# Patient Record
Sex: MALE | Race: WHITE
[De-identification: names, ages, dates, MRNs, and addresses within clinical notes are randomized per-mention and may not be internally consistent; named-entity substitution may affect disease eponyms.]

---

## 2017-06-28 ENCOUNTER — HOSPITAL ENCOUNTER (OUTPATIENT)
Dept: HOSPITAL 53 - M LAB | Age: 32
End: 2017-06-28
Attending: PODIATRIST
Payer: OTHER GOVERNMENT

## 2017-06-28 DIAGNOSIS — Z01.812: Primary | ICD-10-CM

## 2017-06-28 LAB
ANION GAP SERPL CALC-SCNC: 3 MEQ/L (ref 8–16)
BASOPHILS # BLD AUTO: 0.1 K/MM3 (ref 0–0.2)
BASOPHILS NFR BLD AUTO: 1.6 % (ref 0–1)
BUN SERPL-MCNC: 17 MG/DL (ref 7–18)
CALCIUM SERPL-MCNC: 8.9 MG/DL (ref 8.5–10.1)
CHLORIDE SERPL-SCNC: 107 MEQ/L (ref 98–107)
CO2 SERPL-SCNC: 32 MEQ/L (ref 21–32)
CREAT SERPL-MCNC: 1.01 MG/DL (ref 0.7–1.3)
EOSINOPHIL # BLD AUTO: 0.1 K/MM3 (ref 0–0.5)
EOSINOPHIL NFR BLD AUTO: 1.4 % (ref 0–3)
ERYTHROCYTE [DISTWIDTH] IN BLOOD BY AUTOMATED COUNT: 12.2 % (ref 11.5–14.5)
GFR SERPL CREATININE-BSD FRML MDRD: > 60 ML/MIN/{1.73_M2} (ref 60–?)
GLUCOSE SERPL-MCNC: 81 MG/DL (ref 70–105)
LARGE UNSTAINED CELL #: 0.2 K/MM3 (ref 0–0.4)
LARGE UNSTAINED CELL %: 2.5 % (ref 0–4)
LYMPHOCYTES # BLD AUTO: 2.7 K/MM3 (ref 1.5–4.5)
LYMPHOCYTES NFR BLD AUTO: 40.8 % (ref 24–44)
MCH RBC QN AUTO: 31.5 PG (ref 27–33)
MCHC RBC AUTO-ENTMCNC: 35.6 G/DL (ref 32–36.5)
MCV RBC AUTO: 88.4 FL (ref 80–96)
MONOCYTES # BLD AUTO: 0.4 K/MM3 (ref 0–0.8)
MONOCYTES NFR BLD AUTO: 5.3 % (ref 0–5)
NEUTROPHILS # BLD AUTO: 3.2 K/MM3 (ref 1.8–7.7)
NEUTROPHILS NFR BLD AUTO: 48.4 % (ref 36–66)
PLATELET # BLD AUTO: 268 K/MM3 (ref 150–450)
POTASSIUM SERPL-SCNC: 4.3 MEQ/L (ref 3.5–5.1)
SODIUM SERPL-SCNC: 142 MEQ/L (ref 136–145)
WBC # BLD AUTO: 6.6 K/MM3 (ref 4–10)

## 2017-07-07 ENCOUNTER — HOSPITAL ENCOUNTER (OUTPATIENT)
Dept: HOSPITAL 53 - M SDC | Age: 32
Discharge: HOME | End: 2017-07-07
Attending: PODIATRIST
Payer: OTHER GOVERNMENT

## 2017-07-07 VITALS — WEIGHT: 205 LBS | HEIGHT: 68 IN | BODY MASS INDEX: 31.07 KG/M2

## 2017-07-07 VITALS — SYSTOLIC BLOOD PRESSURE: 119 MMHG | DIASTOLIC BLOOD PRESSURE: 82 MMHG

## 2017-07-07 DIAGNOSIS — Z88.0: ICD-10-CM

## 2017-07-07 DIAGNOSIS — M20.12: Primary | ICD-10-CM

## 2017-07-07 DIAGNOSIS — M21.622: ICD-10-CM

## 2017-07-07 PROCEDURE — 28113 PART REMOVAL OF METATARSAL: CPT

## 2017-07-07 PROCEDURE — 97116 GAIT TRAINING THERAPY: CPT

## 2017-07-07 PROCEDURE — 73630 X-RAY EXAM OF FOOT: CPT

## 2017-07-07 PROCEDURE — 88300 SURGICAL PATH GROSS: CPT

## 2017-07-07 PROCEDURE — 28296 COR HLX VLGS DSTL MTAR OSTEO: CPT

## 2017-07-07 NOTE — REP
Clinical:  Status post surgery for baseline.

 

Technique:  Portable AP, lateral, bilateral oblique views.

 

Findings:

The patient is status post bunionectomy with postsurgical changes involving the

first and fifth metatarsal bones.  Overlying soft tissue swelling and small

amount of subcutaneous emphysema are appreciated.

 

Impression:

Status post operative procedures involving the first and fifth metatarsal bones.

Satisfactory alignment suggested.

 

 

Signed by

Yonny Lucero MD 07/07/2017 11:18 A

## 2017-07-08 NOTE — RO
DATE OF PROCEDURE:  07/07/2017

 

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

the left foot.  Bunionette deformity of the left foot.

 

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

the left foot.  Bunionette deformity of the left foot.

 

PROCEDURE PERFORMED:

1.  Veena bunionectomy with internal screw fixation, a 3.0 mm x 24 mm times one,

left foot.

2.  Tailor's bunionectomy with distal V osteotomy and internal screw fixation 2.5

x 16 mm times one, left foot.

 

SURGEON:  Eric Carmichael DPM

 

FIRST ASSISTANT:  None.

 

ANESTHESIA:  Local monitored anesthesia care (MAC).

 

IRRIGATIONS:  Dilute bacitracin, neomycin and polymyxin B solution.

 

HEMOSTASIS:  Ankle pneumatic tourniquet at 250 mmHg for 58 minutes.

 

HARDWARE UTILIZED:  Right DART-FIRE 3.0 x 24 and a 2.5 x 16.

 

DESCRIPTION OF OPERATION:  On 07/07/2017 this 31-year-old white male was taken

from his hospital room to the operating room and placed on the operating room

table in supine position.  Following the induction of IV sedation, local and

regional anesthesia, the left lower extremity was prepped and draped in the usual

aseptic manner.  The left lower extremity was elevated 45 degrees from the

horizontal plane for the purpose of preoperative exsanguination of the limb.

During this 3 minute time period, an ankle pneumatic tourniquet was applied just

proximal to the medial and lateral malleolus, well padded site.  To further

exsanguinate the limb, a Lazaro's esmarch bandage was placed circumferentially

extending from the digits to the distal edge of the ankle pneumatic tourniquet.

The ankle pneumatic tourniquet was rapidly inflated to 250 mmHg for the purpose

of intraoperative hemostasis.  Lazaro's esmarch bandage was removed.  The left

lower extremity was returned to the operating room table, sterile drape was

placed and the following procedure was performed:

 

VEENA BUNIONECTOMY WITH INTERNAL SCREW FIXATION, A 3.0 mm x 24 mm TIMES ONE,

LEFT FOOT:  Attention was directed to the patient's left foot where there was

noted to be a hallux valgus deformity.  At this time, a 6 cm incision was placed

over the first metatarsal phalangeal joint medial to the extensor tendon.  The

incision was deepened through subcutaneous tissues and all coursing venous

tributaries were identified, underscored, clamped, cut, ligated, and

electrocoagulated as necessary.  A linear capsulotomy was then performed in the

same plane as the original skin incision.  The capsular and periosteal structures

were dissected dorsally, medially and laterally, thus creatinine a capsular

periosteal type envelope.  This delivered into view the hypertrophied medial

eminence of the first metatarsal, which was osteotomized from dorsal to plantar

through and through, exiting medially to the sesamoidal groove.

 

Attention was directed into the first intermetatarsal space where the conjoined

tendon was sharply dissected free from the fibular sesamoid.  A V shaped

osteotomy was then performed on the medial aspect of the first metatarsal with a

long plantar and short dorsal wing.  It was transposed approximately 40% of the

width of the shaft of the first metatarsal and fixated with a 3.0 x 24 mm

cannulated compression screw.  The screw did not penetrate the inferior cartilage

on direct visualization.  The redundant cortical spike was then osteotomized from

dorsal to plantar through and through and extirpated from the wound in toto. The

medial surface was rasped to a smooth contour with a handheld rasp.  The wound

was flushed with copious amounts of dilute Bacitracin, Neomycin, and Polymyxin B

solution. Attention was directed towards closure where the capsular structures

were coapted and maintained utilizing #2-0 Monocryl in a simple interrupted type

fashion. The subcutaneous tissues were coapted and maintained utilizing #4-0

Monocryl in a simple interrupted type fashion. The skin incision was coapted and

maintained using #5-0 Monocryl in a continuous subcuticular type fashion.  This

was additionally reinforced with Steri-Strips.  Attention was then directed to

the 5th metatarsal where the following procedure was performed.

 

TAILOR'S BUNIONECTOMY WITH DISTAL V OSTEOTOMY AND INTERNAL SCREW FIXATION 2.5 x

16 mm TIMES ONE, LEFT FOOT:  Attention was directed to the patient's left foot

where there was noted to be a tailor's bunion deformity.  At this time, a 4 cm

lateral incision was placed at the 5th metatarsal phalangeal joint.  The incision

was deepened through subcutaneous tissues and all coursing venous tributaries

were identified, underscored, clamped, cut, ligated, and electrocoagulated as

necessary.  A linear capsulotomy was then performed in the same plane as the

original skin incision.  A capsule was then created dorsally and plantarly.  The

hypertrophied lateral eminence was osteotomized from distal to proximal through

and through.  This was extirpated from the wound.  A V shaped osteotomy was then

performed in the distal metaphysis of the 5th metatarsal with long plantar and

short dorsal wing and transposed approximately 40% of the width of the shaft of

the 5th metatarsal and fixated with a 2.5 x 16 mm cannulated compression screw.

The screw did not penetrate the inferior cartilage felt through range of motion.

The redundant cortical spike was osteotomized from dorsal to plantar through and

through and extirpated from the wound in toto. The lateral surface was rasped to

a smooth contour.  The wound was flushed with copious amounts of dilute

Bacitracin, Neomycin, and Polymyxin B solution.  Closure was obtained with #2-0

Monocryl in a simple interrupted type fashion through the capsular structures.

The subcutaneous tissues were coapted and maintained utilizing #4-0 Monocryl in a

simple interrupted type fashion. The skin incision was coapted and maintained

utilizing #5-0 Monocryl in a continuous subcuticular type fashion and

additionally reinforced with Steri-Strips.

 

Following the completion of the surgical procedure, 4 mg of dexamethasone sodium

phosphate was instilled proximal to the surgical site. Attention was then

directed towards bandaging where a sterile compressive bandage was applied

consisting of Adaptic, 4x4's, 4x4 splints, Siddhartha, Kerlix, and Coban. The ankle

pneumatic tourniquet was rapidly deflated and instantaneous capillary filling

time was noted in digits 1-5 of the patient's left foot. The patient apparently

having tolerated the surgical procedure well was taken from the OR to the

recovery room with vital signs stable, patient afebrile, and for further

monitoring by the anesthesia department.  All surgical specimens removed during

the operative procedure were sent to pathology for examination. Postoperative

instructions given upon discharge.

## 2017-10-03 ENCOUNTER — HOSPITAL ENCOUNTER (OUTPATIENT)
Dept: HOSPITAL 53 - M LAB | Age: 32
End: 2017-10-03
Attending: PODIATRIST
Payer: OTHER GOVERNMENT

## 2017-10-03 DIAGNOSIS — Z01.818: Primary | ICD-10-CM

## 2017-10-03 LAB
ADD MANUAL DIFFER: NO
ANION GAP SERPL CALC-SCNC: 4 MEQ/L (ref 8–16)
BASOPHILS # BLD AUTO: 0.1 10^3/UL (ref 0–0.2)
BASOPHILS NFR BLD AUTO: 0.8 % (ref 0–1)
BUN SERPL-MCNC: 16 MG/DL (ref 7–18)
CALCIUM SERPL-MCNC: 8.9 MG/DL (ref 8.5–10.1)
CHLORIDE SERPL-SCNC: 107 MEQ/L (ref 98–107)
CO2 SERPL-SCNC: 33 MEQ/L (ref 21–32)
CREAT SERPL-MCNC: 1.15 MG/DL (ref 0.7–1.3)
DIFF SLIDE NUMBER: 140
EOSINOPHIL # BLD AUTO: 0.1 10^3/UL (ref 0–0.5)
EOSINOPHIL NFR BLD AUTO: 1.7 % (ref 0–3)
ERYTHROCYTE [DISTWIDTH] IN BLOOD BY AUTOMATED COUNT: 11.8 % (ref 11.5–14.5)
GFR SERPL CREATININE-BSD FRML MDRD: > 60 ML/MIN/{1.73_M2} (ref 60–?)
GLUCOSE SERPL-MCNC: 75 MG/DL (ref 70–105)
IMM GRANULOCYTES NFR BLD: 0.2 % (ref 0–0)
LYMPHOCYTES # BLD AUTO: 1.4 10^3/UL (ref 1.5–4.5)
LYMPHOCYTES NFR BLD AUTO: 21.3 % (ref 24–44)
MCH RBC QN AUTO: 30.6 PG (ref 27–33)
MCHC RBC AUTO-ENTMCNC: 34.9 G/DL (ref 32–36.5)
MCV RBC AUTO: 87.6 FL (ref 80–96)
MONOCYTES # BLD AUTO: 0.4 10^3/UL (ref 0–0.8)
MONOCYTES NFR BLD AUTO: 6.2 % (ref 0–5)
NEUTROPHILS # BLD AUTO: 4.6 10^3/UL (ref 1.8–7.7)
NEUTROPHILS NFR BLD AUTO: 69.8 % (ref 36–66)
NRBC BLD AUTO-RTO: 0 % (ref 0–0)
PLATELET # BLD AUTO: 255 10^3/UL (ref 150–450)
POTASSIUM SERPL-SCNC: 3.8 MEQ/L (ref 3.5–5.1)
SODIUM SERPL-SCNC: 144 MEQ/L (ref 136–145)
WBC # BLD AUTO: 6.6 10^3/UL (ref 4–10)

## 2017-10-06 ENCOUNTER — HOSPITAL ENCOUNTER (OUTPATIENT)
Dept: HOSPITAL 53 - M SDC | Age: 32
Discharge: HOME | End: 2017-10-06
Attending: PODIATRIST
Payer: OTHER GOVERNMENT

## 2017-10-06 VITALS — WEIGHT: 220 LBS | HEIGHT: 67 IN | BODY MASS INDEX: 34.53 KG/M2

## 2017-10-06 VITALS — DIASTOLIC BLOOD PRESSURE: 74 MMHG | SYSTOLIC BLOOD PRESSURE: 121 MMHG

## 2017-10-06 DIAGNOSIS — M21.621: ICD-10-CM

## 2017-10-06 DIAGNOSIS — M20.11: Primary | ICD-10-CM

## 2017-10-06 DIAGNOSIS — Z88.0: ICD-10-CM

## 2017-10-06 PROCEDURE — 28110 PART REMOVAL OF METATARSAL: CPT

## 2017-10-06 PROCEDURE — 28296 COR HLX VLGS DSTL MTAR OSTEO: CPT

## 2017-10-06 PROCEDURE — 88300 SURGICAL PATH GROSS: CPT

## 2017-10-06 PROCEDURE — 73630 X-RAY EXAM OF FOOT: CPT

## 2017-10-06 NOTE — RO
DATE OF PROCEDURE:  10/06/2017

 

PREOPERATIVE DIAGNOSES:

Hallux valgus metatarsus primus varus deformity, right foot.

Tailor's bunion deformity, right foot.

 

POSTOPERATIVE DIAGNOSES:

Hallux valgus metatarsus primus varus deformity, right foot.

Tailor's bunion deformity, right foot.

 

SURGERY PERFORMED:

Veena bunionectomy with internal screw fixation, right foot, 3.0 mm x 22 mm

times one.

Tailor's bunionectomy with distal V osteotomy, internal screw fixation, 2.5 mm x

16 mm times one right foot.

 

SURGEON:  Eric Carmichael DPM

 

ASSISTANT:  None.

 

ANESTHESIA:  Local monitored anesthesia care (MAC).

 

HEMOSTASIS:  Ankle pneumatic tourniquet.

 

TOURNIQUET TIME:  1 hour, right ankle.

 

HARDWARE UTILIZED:  Cohen DART-FIRE 3.0 x 22 and a 2.5 x 16.

 

DESCRIPTION OF OPERATION:  On 10/06/2017, this 32-year-old white male was taken

from his hospital room to the operating room and placed on the operating room

table in supine position.  Following the induction of IV sedation, local and

regional anesthesia, the right lower extremity was prepped and draped in the

usual aseptic manner.  Attention was directed to the patient's right foot.

Sterile draping was completed and the ankle pneumatic tourniquet was rapidly

inflated to 220 mmHg and the following procedure was performed:

 

VEENA BUNIONECTOMY WITH INTERNAL SCREW FIXATION, 3.0 MM X 22 MM TIMES ONE, RIGHT

FOOT:  Attention was directed to the patient's right foot where there was noted

to be a hallux valgus deformity.  At this time, a 5 cm incision was placed over

the dorsal aspect of the first metatarsal phalangeal joint medial to the extensor

tendon.  The incision was deepened through subcutaneous tissues and all coursing

venous tributaries were identified, underscored, clamped, cut, ligated, and

electrocoagulated as necessary.  A linear capsulotomy was then performed in the

same plane as the original skin incision.  The capsular and periosteal structures

were dissected free, dorsally and medially.  Hypertrophied medial eminence was

then osteotomized from dorsal to plantar through and through, exiting medial the

sesamoidal groove.  Attention was then directed into the first intermetatarsal

space where dissection was carried down to the level of the fibular sesamoid.

The conjoin tendon ws sharply dissected free from the fibular sesamoid.

Attention was then directed to the medial surface of the first metatarsal where a

V-shaped osteotomy was performed with a long plantar and short dorsal wing.  Upon

creation of this osteotomy, the capital fragment was transposed 40% of the width

of the shaft of the first metatarsal and fixated with a 3.0 mm x 22 mm cannulated

compression screw.  Redundant cortical spike was then osteotomized from dorsal to

plantar through and through and the medial surface was rasped to a smooth

contour.  The wound was flushed with copious amounts of dilute bacitracin,

neomycin, and polymyxin B solution.

 

Attention was directed towards closure where the capsular structures were coapted

and maintained utilizing #2-0 Monocryl in a simple interrupted type fashion.  The

subcutaneous tissues were coapted and maintained utilizing #4-0 Monocryl in a

simple interrupted type fashion.  The skin incision was coapted and maintained

utilizing #5-0 Monocryl in a continuous subcuticular type fashion.  This was

additionally reinforced with Steri-Strips.  Attention was directed to the lateral

surface of the foot where the following procedure was performed:

 

TAILOR'S BUNIONECTOMY WITH DISTAL V OSTEOTOMY, INTERNAL SCREW FIXATION, 2.5 MM X

16 MM TIMES ONE, RIGHT FOOT:

Attention was directed to the patient's foot where there was noted to be a

Tailors bunion deformity.  At this time, a lateral incision was placed over the

fifth metatarsal phalangeal joint.  The incision was deepened through

subcutaneous tissues and all coursing venous tributaries were identified,

underscored, clamped, cut, ligated, and electrocoagulated as necessary.  A linear

capsulotomy was performed in the same plane as the original skin incision and the

capsule and periosteal structures were then dissected free in one continuous

layer dorsally and plantarly.  The hypertrophied lateral eminence was then

osteotomized from distal to proximal through and through and extirpated from the

wound.  A V-shaped osteotomy was then performed in the distal aspect of the fifth

metatarsal with a long plantar and short dorsal wing.  The capital fragment was

transposed 40% of the width of the shaft of the fifth metatarsal and fixated with

a 2.5 x 16 mm cannulated compression screw.  The redundant cortical spike was

osteotomized from dorsal to plantar through and through.  The lateral surface was

rasped to a smooth contour with a handheld rasp.  The wound was with copious

amounts of dilute bacitracin, neomycin, and polymyxin B solution.

 

Attention was directed towards closure where the capsular structures were coapted

and maintained with #2-0 Monocryl in a simple interrupted type fashion.  The

subcutaneous tissues were coapted and maintained utilizing #4-0 Monocryl in a

simple interrupted type fashion.  The skin incision was coapted and maintained

utilizing #5-0 Monocryl in a continuous subcuticular type fashion.  This was

additionally reinforced with Steri-Strips.

 

Following the completion of the surgical procedure, 4 mg of dexamethasone sodium

phosphate was instilled proximal to the surgical site.  Attention was directed

towards bandaging where a sterile compressive bandage was applied consisting of

Adaptic, 4x4s, 4x4 splints, Siddhartha, Kerlix, and Coban.  The ankle pneumatic

tourniquet was rapidly deflated and instantaneous capillary filling time was

noted in digits 1-5 of the patient's right foot.  The patient apparently having

tolerated the surgical procedure well was taken from the OR to the recovery room

with vital signs stable and the patient afebrile for further monitoring by the

anesthesia department.  All surgical specimens removed during the operative

procedure were sent to pathology for gross and microscopic examination.

Postoperative instructions will be given upon discharge.

## 2017-10-06 NOTE — REP
RIGHT FOOT SERIES:  THREE VIEWS.

 

HISTORY:  Postop evaluation.

 

FINDINGS:  Three views of the right foot are obtained through overlying dressing.

The patient is status post distal 1st and distal 5th metatarsal osteotomies.  A

metallic screw is seen pinning each of these.  There is associated soft-tissue

swelling and emphysema.  Mild Achilles calcaneal spurring is seen.  No other

abnormality.

 

IMPRESSION:

 

Status post bunion repair distal 1st and distal 5th metatarsal.

 

 

Signed by

Nicanor Lipscomb MD 10/06/2017 03:49 P

## 2018-03-12 ENCOUNTER — HOSPITAL ENCOUNTER (OUTPATIENT)
Dept: HOSPITAL 53 - M RAD | Age: 33
End: 2018-03-12
Attending: FAMILY MEDICINE
Payer: COMMERCIAL

## 2018-03-12 DIAGNOSIS — R51: Primary | ICD-10-CM

## 2018-05-07 ENCOUNTER — HOSPITAL ENCOUNTER (OUTPATIENT)
Dept: HOSPITAL 53 - M RAD | Age: 33
End: 2018-05-07
Attending: OTOLARYNGOLOGY
Payer: COMMERCIAL

## 2018-05-07 DIAGNOSIS — J32.0: Primary | ICD-10-CM

## 2018-05-07 PROCEDURE — 70486 CT MAXILLOFACIAL W/O DYE: CPT

## 2018-05-14 ENCOUNTER — HOSPITAL ENCOUNTER (OUTPATIENT)
Dept: HOSPITAL 53 - M RAD | Age: 33
End: 2018-05-14
Attending: PHYSICIAN ASSISTANT
Payer: COMMERCIAL

## 2018-05-14 DIAGNOSIS — G57.01: Primary | ICD-10-CM
